# Patient Record
Sex: MALE | Race: WHITE | Employment: FULL TIME | URBAN - METROPOLITAN AREA
[De-identification: names, ages, dates, MRNs, and addresses within clinical notes are randomized per-mention and may not be internally consistent; named-entity substitution may affect disease eponyms.]

---

## 2019-02-18 ENCOUNTER — APPOINTMENT (OUTPATIENT)
Dept: GENERAL RADIOLOGY | Facility: CLINIC | Age: 49
End: 2019-02-18
Attending: STUDENT IN AN ORGANIZED HEALTH CARE EDUCATION/TRAINING PROGRAM
Payer: COMMERCIAL

## 2019-02-18 ENCOUNTER — HOSPITAL ENCOUNTER (EMERGENCY)
Facility: CLINIC | Age: 49
Discharge: HOME | End: 2019-02-18
Attending: STUDENT IN AN ORGANIZED HEALTH CARE EDUCATION/TRAINING PROGRAM | Admitting: STUDENT IN AN ORGANIZED HEALTH CARE EDUCATION/TRAINING PROGRAM
Payer: COMMERCIAL

## 2019-02-18 VITALS
OXYGEN SATURATION: 97 % | WEIGHT: 185 LBS | DIASTOLIC BLOOD PRESSURE: 78 MMHG | HEIGHT: 72 IN | TEMPERATURE: 97.9 F | BODY MASS INDEX: 25.06 KG/M2 | HEART RATE: 69 BPM | RESPIRATION RATE: 12 BRPM | SYSTOLIC BLOOD PRESSURE: 121 MMHG

## 2019-02-18 DIAGNOSIS — R07.81 PLEURITIC CHEST PAIN: ICD-10-CM

## 2019-02-18 LAB
ALBUMIN SERPL-MCNC: 3.9 G/DL (ref 3.4–5)
ALP SERPL-CCNC: 88 U/L (ref 40–150)
ALT SERPL W P-5'-P-CCNC: 20 U/L (ref 0–70)
ANION GAP SERPL CALCULATED.3IONS-SCNC: 4 MMOL/L (ref 3–14)
AST SERPL W P-5'-P-CCNC: 19 U/L (ref 0–45)
BASOPHILS # BLD AUTO: 0.1 10E9/L (ref 0–0.2)
BASOPHILS NFR BLD AUTO: 0.9 %
BILIRUB SERPL-MCNC: 1.5 MG/DL (ref 0.2–1.3)
BUN SERPL-MCNC: 13 MG/DL (ref 7–30)
CALCIUM SERPL-MCNC: 8.7 MG/DL (ref 8.5–10.1)
CHLORIDE SERPL-SCNC: 107 MMOL/L (ref 94–109)
CO2 SERPL-SCNC: 30 MMOL/L (ref 20–32)
CREAT SERPL-MCNC: 0.92 MG/DL (ref 0.66–1.25)
D DIMER PPP FEU-MCNC: 0.3 UG/ML FEU (ref 0–0.5)
DIFFERENTIAL METHOD BLD: NORMAL
EOSINOPHIL # BLD AUTO: 0.2 10E9/L (ref 0–0.7)
EOSINOPHIL NFR BLD AUTO: 3.3 %
ERYTHROCYTE [DISTWIDTH] IN BLOOD BY AUTOMATED COUNT: 11.7 % (ref 10–15)
GFR SERPL CREATININE-BSD FRML MDRD: >90 ML/MIN/{1.73_M2}
GLUCOSE SERPL-MCNC: 113 MG/DL (ref 70–99)
HCT VFR BLD AUTO: 44.4 % (ref 40–53)
HGB BLD-MCNC: 15.3 G/DL (ref 13.3–17.7)
IMM GRANULOCYTES # BLD: 0 10E9/L (ref 0–0.4)
IMM GRANULOCYTES NFR BLD: 0.4 %
LYMPHOCYTES # BLD AUTO: 1.1 10E9/L (ref 0.8–5.3)
LYMPHOCYTES NFR BLD AUTO: 19.7 %
MCH RBC QN AUTO: 31.3 PG (ref 26.5–33)
MCHC RBC AUTO-ENTMCNC: 34.5 G/DL (ref 31.5–36.5)
MCV RBC AUTO: 91 FL (ref 78–100)
MONOCYTES # BLD AUTO: 0.5 10E9/L (ref 0–1.3)
MONOCYTES NFR BLD AUTO: 9.4 %
NEUTROPHILS # BLD AUTO: 3.6 10E9/L (ref 1.6–8.3)
NEUTROPHILS NFR BLD AUTO: 66.3 %
NRBC # BLD AUTO: 0 10*3/UL
NRBC BLD AUTO-RTO: 0 /100
PLATELET # BLD AUTO: 251 10E9/L (ref 150–450)
POTASSIUM SERPL-SCNC: 4 MMOL/L (ref 3.4–5.3)
PROT SERPL-MCNC: 7.1 G/DL (ref 6.8–8.8)
RBC # BLD AUTO: 4.89 10E12/L (ref 4.4–5.9)
SODIUM SERPL-SCNC: 141 MMOL/L (ref 133–144)
TROPONIN I SERPL-MCNC: <0.015 UG/L (ref 0–0.04)
WBC # BLD AUTO: 5.4 10E9/L (ref 4–11)

## 2019-02-18 PROCEDURE — 93010 ELECTROCARDIOGRAM REPORT: CPT | Mod: Z6 | Performed by: STUDENT IN AN ORGANIZED HEALTH CARE EDUCATION/TRAINING PROGRAM

## 2019-02-18 PROCEDURE — 84484 ASSAY OF TROPONIN QUANT: CPT | Performed by: STUDENT IN AN ORGANIZED HEALTH CARE EDUCATION/TRAINING PROGRAM

## 2019-02-18 PROCEDURE — 85025 COMPLETE CBC W/AUTO DIFF WBC: CPT | Performed by: STUDENT IN AN ORGANIZED HEALTH CARE EDUCATION/TRAINING PROGRAM

## 2019-02-18 PROCEDURE — 85379 FIBRIN DEGRADATION QUANT: CPT | Performed by: STUDENT IN AN ORGANIZED HEALTH CARE EDUCATION/TRAINING PROGRAM

## 2019-02-18 PROCEDURE — 71046 X-RAY EXAM CHEST 2 VIEWS: CPT

## 2019-02-18 PROCEDURE — 80053 COMPREHEN METABOLIC PANEL: CPT | Performed by: STUDENT IN AN ORGANIZED HEALTH CARE EDUCATION/TRAINING PROGRAM

## 2019-02-18 PROCEDURE — 93005 ELECTROCARDIOGRAM TRACING: CPT

## 2019-02-18 PROCEDURE — 99285 EMERGENCY DEPT VISIT HI MDM: CPT | Mod: 25

## 2019-02-18 PROCEDURE — 99284 EMERGENCY DEPT VISIT MOD MDM: CPT | Mod: 25 | Performed by: STUDENT IN AN ORGANIZED HEALTH CARE EDUCATION/TRAINING PROGRAM

## 2019-02-18 ASSESSMENT — MIFFLIN-ST. JEOR: SCORE: 1737.15

## 2019-02-18 NOTE — ED PROVIDER NOTES
History     Chief Complaint   Patient presents with     Chest Wall Pain     Pt was snowshoeing yesterday and now with left chest pain - worse with movement     HPI  Thanh Clements is a 49 year old male visiting from [facility] who presents for evaluation of left-sided chest pain.  Patient explains that Saturday while snowshoeing he developed sharp stabbing left-sided chest pain which has persisted into today.  The pain is described as moderate in severity and seemingly unrelieved with acetaminophen and ibuprofen last taken 1 hour prior to arrival.  The pain is exacerbated with deep inspiration, however he notes improvement in chest pain when holding his left chest wall and ribs.  He denies substernal chest pressure or radiating pain.  No recent fever, chills, cough, shortness of breath, back pain, abdominal pain, nausea or vomiting.  No history of similar symptoms.  No known history of CAD, DVT or PE.    Allergies:  No Known Allergies    Problem List:    There are no active problems to display for this patient.       Past Medical History:    No past medical history on file.    Past Surgical History:    No past surgical history on file.    Family History:    No family history on file.    Social History:  Marital Status:   [2]  Social History     Tobacco Use     Smoking status: Not on file   Substance Use Topics     Alcohol use: Not on file     Drug use: Not on file        Medications:      No current outpatient medications on file.      Review of Systems  Constitutional:  Negative for fever or recent illness.  Cardiovascular: Positive for sharp left-sided pleuritic chest pain, nonradiating.  Respiratory:  Negative for cough or shortness of breath.  Gastrointestinal:  Negative for abdominal pain, nausea or vomiting.  Musculoskeletal: Negative for neck pain, back pain, or recent injury.    All others reviewed and are negative.      Physical Exam   BP: 133/76  Pulse: 65  Heart Rate: 66  Temp: 97.9  F  (36.6  C)  Resp: 16  Height: 182.9 cm (6')  Weight: 83.9 kg (185 lb)  SpO2: 100 %      Physical Exam  Constitutional:  Well developed, well nourished.  Appears nontoxic moderately uncomfortable resting in the gurney.  HENT:  Normocephalic and atraumatic.  Symmetric in appearance.  Eyes:  Conjunctivae are normal.  Neck:  Neck supple.  Cardiovascular:  No cyanosis.  RRR.  No audible murmurs noted.  No lower extremity edema or asymmetry, denies calf tenderness.  Respiratory:  Effort normal without sign of respiratory distress.  CTAB without diminished regions.   Gastrointestinal:  Soft nondistended abdomen.  Nontender and without guarding.  No rigidity or rebound tenderness.  Negative Sauceda's sign.  Negative McBurney's point.   Genitourinary:  Noncontributory.   Musculoskeletal:  Moves extremities spontaneously.  Neurological:  Patient is alert.  Skin:  Skin is warm and dry.  Psychiatric:  Normal mood and affect.      ED Course        Procedures                 EKG Interpretation:      Interpreted by: Keanu Lopez  Time reviewed: Upon arrival     Symptoms at time of EKG: Chest pain  Rhythm: Sinus  Rate: Normal  Axis: Normal    Conduction: None atypical   ST Segments/ T Waves: No pathologic ST-elevations or T-wave abnormalities.  Q Waves: None  Comparison to prior: None available     Clinical Impression: No sign of ischemia         Critical Care time:                 Results for orders placed or performed during the hospital encounter of 02/18/19 (from the past 24 hour(s))   CBC with platelets differential   Result Value Ref Range    WBC 5.4 4.0 - 11.0 10e9/L    RBC Count 4.89 4.4 - 5.9 10e12/L    Hemoglobin 15.3 13.3 - 17.7 g/dL    Hematocrit 44.4 40.0 - 53.0 %    MCV 91 78 - 100 fl    MCH 31.3 26.5 - 33.0 pg    MCHC 34.5 31.5 - 36.5 g/dL    RDW 11.7 10.0 - 15.0 %    Platelet Count 251 150 - 450 10e9/L    Diff Method Automated Method     % Neutrophils 66.3 %    % Lymphocytes 19.7 %    % Monocytes 9.4 %    %  Eosinophils 3.3 %    % Basophils 0.9 %    % Immature Granulocytes 0.4 %    Nucleated RBCs 0 0 /100    Absolute Neutrophil 3.6 1.6 - 8.3 10e9/L    Absolute Lymphocytes 1.1 0.8 - 5.3 10e9/L    Absolute Monocytes 0.5 0.0 - 1.3 10e9/L    Absolute Eosinophils 0.2 0.0 - 0.7 10e9/L    Absolute Basophils 0.1 0.0 - 0.2 10e9/L    Abs Immature Granulocytes 0.0 0 - 0.4 10e9/L    Absolute Nucleated RBC 0.0    Comprehensive metabolic panel   Result Value Ref Range    Sodium 141 133 - 144 mmol/L    Potassium 4.0 3.4 - 5.3 mmol/L    Chloride 107 94 - 109 mmol/L    Carbon Dioxide 30 20 - 32 mmol/L    Anion Gap 4 3 - 14 mmol/L    Glucose 113 (H) 70 - 99 mg/dL    Urea Nitrogen 13 7 - 30 mg/dL    Creatinine 0.92 0.66 - 1.25 mg/dL    GFR Estimate >90 >60 mL/min/[1.73_m2]    GFR Estimate If Black >90 >60 mL/min/[1.73_m2]    Calcium 8.7 8.5 - 10.1 mg/dL    Bilirubin Total 1.5 (H) 0.2 - 1.3 mg/dL    Albumin 3.9 3.4 - 5.0 g/dL    Protein Total 7.1 6.8 - 8.8 g/dL    Alkaline Phosphatase 88 40 - 150 U/L    ALT 20 0 - 70 U/L    AST 19 0 - 45 U/L   Troponin I   Result Value Ref Range    Troponin I ES <0.015 0.000 - 0.045 ug/L   D dimer quantitative   Result Value Ref Range    D Dimer 0.3 0.0 - 0.50 ug/ml FEU   Chest XR,  PA & LAT    Narrative    CHEST TWO VIEWS February 18, 2019 10:54 AM     HISTORY: 49-year-old patient with sharp pleuritic left chest pain.    COMPARISON: None.       Impression    IMPRESSION: Heart size is normal. No pleural effusion, pneumothorax,  or abnormal area of consolidation.       Medications - No data to display    Assessments & Plan (with Medical Decision Making)   Thanh Clements is a 49 year old male who presents to the department for evaluation of nearly 3 days of sharp achy left-sided chest discomfort.  The pain is reproducible with deep inspiration but seems to improve with palpation.  Differential diagnosis included ACS, pulmonary embolism, aortic dissection, pneumothorax, myocarditis, pneumonia or infectious  process.  EKG morphology without ischemia and troponin within reference range despite duration of symptoms.  No lower extremity swelling, history VTE, and d-dimer within reference range.  Chest radiograph independently reviewed and no sign of pneumothorax or pleural effusion.  Afebrile and low suspicion for infectious etiology.  Further discussions revealed that he is left-hand dominant and had been using poles for snowshoeing on Saturday, could represent musculoskeletal etiology or even pleurisy.  Recommend OTC NSAIDs as directed and close monitoring for expected improvement.He is agreeable with the discharge plan we discussed including follow up.    Disclaimer:  This note consists of symbols derived from keyboarding, dictation, and/or voice recognition software.  As a result, there may be errors in the script that have gone undetected.  Please consider this when interpreting information found in the chart.        I have reviewed the nursing notes.    I have reviewed the findings, diagnosis, plan and need for follow up with the patient.             Medication List      There are no discharge medications for this visit.         Final diagnoses:   Pleuritic chest pain       2/18/2019   [facility] EMERGENCY DEPARTMENT     Keanu Lopez DO  02/18/19 1124

## 2019-02-18 NOTE — ED NOTES
Pt presents to ED with complaints of epigastric, left/central chest pain intermittently, but much worse yesterday and this am. Bumpy roads and a deep breath make the pain worse, pressing on it makes the pain better. Pt has tried ibuprofen and tylenol without relief. Pt describes the pain as sharp/stabbing. Pain does radiate to the left side of the chest. Pt has been traveling, will be moving back to [place] from [place] in the summer.

## 2019-02-18 NOTE — ED AVS SNAPSHOT
[facility] Emergency Department  [address] [address]  Phone:  [phone]  Fax:  [phone]                                    Thanh Clements   MRN: [id number]    Department:  [facility] Emergency Department   Date of Visit:  2/18/2019           After Visit Summary Signature Page    I have received my discharge instructions, and my questions have been answered. I have discussed any challenges I see with this plan with the nurse or doctor.    ..........................................................................................................................................  Patient/Patient Representative Signature      ..........................................................................................................................................  Patient Representative Print Name and Relationship to Patient    ..................................................               ................................................  Date                                   Time    ..........................................................................................................................................  Reviewed by Signature/Title    ...................................................              ..............................................  Date                                               Time          22EPIC Rev 08/18